# Patient Record
Sex: FEMALE | Race: WHITE
[De-identification: names, ages, dates, MRNs, and addresses within clinical notes are randomized per-mention and may not be internally consistent; named-entity substitution may affect disease eponyms.]

---

## 2020-03-11 ENCOUNTER — HEALTH MAINTENANCE LETTER (OUTPATIENT)
Age: 30
End: 2020-03-11

## 2021-01-03 ENCOUNTER — HEALTH MAINTENANCE LETTER (OUTPATIENT)
Age: 31
End: 2021-01-03

## 2021-01-29 ENCOUNTER — IMMUNIZATION (OUTPATIENT)
Dept: NURSING | Facility: CLINIC | Age: 31
End: 2021-01-29
Payer: COMMERCIAL

## 2021-01-29 PROCEDURE — 91300 PR COVID VAC PFIZER DIL RECON 30 MCG/0.3 ML IM: CPT

## 2021-01-29 PROCEDURE — 0001A PR COVID VAC PFIZER DIL RECON 30 MCG/0.3 ML IM: CPT

## 2021-02-19 ENCOUNTER — IMMUNIZATION (OUTPATIENT)
Dept: NURSING | Facility: CLINIC | Age: 31
End: 2021-02-19
Attending: INTERNAL MEDICINE
Payer: COMMERCIAL

## 2021-02-19 PROCEDURE — 91300 PR COVID VAC PFIZER DIL RECON 30 MCG/0.3 ML IM: CPT

## 2021-02-19 PROCEDURE — 0002A PR COVID VAC PFIZER DIL RECON 30 MCG/0.3 ML IM: CPT

## 2021-04-25 ENCOUNTER — HEALTH MAINTENANCE LETTER (OUTPATIENT)
Age: 31
End: 2021-04-25

## 2021-10-10 ENCOUNTER — HEALTH MAINTENANCE LETTER (OUTPATIENT)
Age: 31
End: 2021-10-10

## 2022-02-03 ENCOUNTER — LAB OUTSIDE AN ENCOUNTER (OUTPATIENT)
Dept: URBAN - METROPOLITAN AREA CLINIC 113 | Facility: CLINIC | Age: 32
End: 2022-02-03

## 2022-02-03 ENCOUNTER — OFFICE VISIT (OUTPATIENT)
Dept: URBAN - METROPOLITAN AREA CLINIC 113 | Facility: CLINIC | Age: 32
End: 2022-02-03
Payer: COMMERCIAL

## 2022-02-03 ENCOUNTER — WEB ENCOUNTER (OUTPATIENT)
Dept: URBAN - METROPOLITAN AREA CLINIC 113 | Facility: CLINIC | Age: 32
End: 2022-02-03

## 2022-02-03 VITALS
TEMPERATURE: 98.6 F | BODY MASS INDEX: 28.17 KG/M2 | SYSTOLIC BLOOD PRESSURE: 146 MMHG | WEIGHT: 165 LBS | HEART RATE: 118 BPM | DIASTOLIC BLOOD PRESSURE: 100 MMHG | HEIGHT: 64 IN

## 2022-02-03 DIAGNOSIS — R93.5 ABNORMAL CT OF THE ABDOMEN: ICD-10-CM

## 2022-02-03 DIAGNOSIS — R19.7 DIARRHEA, UNSPECIFIED TYPE: ICD-10-CM

## 2022-02-03 DIAGNOSIS — K62.5 BLOOD PER RECTUM: ICD-10-CM

## 2022-02-03 PROCEDURE — 99204 OFFICE O/P NEW MOD 45 MIN: CPT | Performed by: NURSE PRACTITIONER

## 2022-02-03 RX ORDER — HYOSCYAMINE SULFATE 0.12 MG/1
1 TABLET UNDER THE TONGUE AND ALLOW TO DISSOLVE  AS NEEDED TABLET SUBLINGUAL
Qty: 90 | Refills: 3 | OUTPATIENT
Start: 2022-02-03 | End: 2022-06-03

## 2022-02-03 RX ORDER — SODIUM, POTASSIUM,MAG SULFATES 17.5-3.13G
354 ML SOLUTION, RECONSTITUTED, ORAL ORAL ONCE
Qty: 354 MILLILITER | Refills: 0 | OUTPATIENT
Start: 2022-02-03 | End: 2022-02-04

## 2022-02-03 NOTE — HPI-TODAY'S VISIT:
31-year-old female presenting for evaluation of colitis. She was recently seen in the ED at [facility] where a CT scan of the abdomen and pelvis with contrast showed multifocal areas of mucosal thickening involving the colon without pericolonic inflammation.  Primary considerations include Crohn's disease or other inflammatory colitides versus infectious colitis. She was discharged with Cipro for possible infectious colitis.  She went to the ED on 1/5/22 for complaints of diarrhea ongoing for 2 months. She noted bright red blood per rectum as well. She was also discharged with prednisone taper. She has watery diarrhea, approximately 10 times per day. She can wake at night time for a bowel movement. She has blood per rectum. No fever or chills. There is mild generalized to left sided abdominal pain. There is nausea but not vomiting. She is not losing weight. There is weakness and fatigue from frequent diarrhea. She can become short of breath easily and can become lightheaded quickly too.

## 2022-02-08 LAB
A/G RATIO: 1.7
ALBUMIN: 4
ALKALINE PHOSPHATASE: 86
ALT (SGPT): 32
AST (SGOT): 16
BASO (ABSOLUTE): 0.1
BASOS: 1
BILIRUBIN, TOTAL: <0.2
BUN/CREATININE RATIO: 10
BUN: 7
C-REACTIVE PROTEIN, QUANT: 3
CALCIUM: 9
CARBON DIOXIDE, TOTAL: 25
CHLORIDE: 102
CREATININE: 0.7
EGFR IF AFRICN AM: 135
EGFR IF NONAFRICN AM: 117
EOS (ABSOLUTE): 0.4
EOS: 4
GLOBULIN, TOTAL: 2.4
GLUCOSE: 89
HEMATOCRIT: 28.1
HEMATOLOGY COMMENTS:: (no result)
HEMOGLOBIN: 8.7
IMMATURE CELLS: (no result)
IMMATURE GRANS (ABS): 0.1
IMMATURE GRANULOCYTES: 1
LYMPHS (ABSOLUTE): 1.9
LYMPHS: 19
MCH: 25.9
MCHC: 31
MCV: 84
MONOCYTES(ABSOLUTE): 1.1
MONOCYTES: 11
NEUTROPHILS (ABSOLUTE): 6.4
NEUTROPHILS: 64
NRBC: (no result)
PLATELETS: 412
POTASSIUM: 4.2
PROTEIN, TOTAL: 6.4
RBC: 3.36
RDW: 13.9
SEDIMENTATION RATE-WESTERGREN: 14
SODIUM: 139
WBC: 10.1

## 2022-02-16 ENCOUNTER — OFFICE VISIT (OUTPATIENT)
Dept: URBAN - METROPOLITAN AREA MEDICAL CENTER 40 | Facility: MEDICAL CENTER | Age: 32
End: 2022-02-16

## 2022-02-16 DIAGNOSIS — K62.5 ANAL BLEEDING: ICD-10-CM

## 2022-02-16 DIAGNOSIS — R93.5 ABNORMAL ABDOMINAL CT SCAN: ICD-10-CM

## 2022-02-16 LAB
C DIFFICILE TOXINS A+B, EIA: NEGATIVE
CAMPYLOBACTER CULTURE: (no result)
E COLI SHIGA TOXIN EIA: NEGATIVE
LACTOFERRIN, FECAL, QUANT.: 84.65
Lab: (no result)
OVA + PARASITE EXAM: (no result)
SALMONELLA/SHIGELLA SCREEN: (no result)

## 2022-03-01 ENCOUNTER — TELEPHONE ENCOUNTER (OUTPATIENT)
Dept: URBAN - METROPOLITAN AREA CLINIC 113 | Facility: CLINIC | Age: 32
End: 2022-03-01

## 2022-03-01 RX ORDER — MESALAMINE 1.2 G/1
2 TABLETS WITH A MEAL TABLET, DELAYED RELEASE ORAL ONCE A DAY
Qty: 60 | OUTPATIENT
Start: 2022-03-01 | End: 2022-03-31

## 2022-03-01 RX ORDER — HYOSCYAMINE SULFATE 0.12 MG/1
1 TABLET UNDER THE TONGUE AND ALLOW TO DISSOLVE  AS NEEDED TABLET SUBLINGUAL
Qty: 90 | Refills: 3 | Status: ACTIVE | COMMUNITY
Start: 2022-02-03 | End: 2022-06-03

## 2022-03-10 ENCOUNTER — TELEPHONE ENCOUNTER (OUTPATIENT)
Dept: URBAN - METROPOLITAN AREA CLINIC 72 | Facility: CLINIC | Age: 32
End: 2022-03-10

## 2022-03-10 RX ORDER — PREDNISONE 10 MG/1
4 TABLETS DAILY FOR 1 WEEK, THEN 3 TABLETS DAILY FOR 1 WEEK, THEN 2 TABLETS DAILY FOR 1 WEEK, THEN 1 TABLET DAILY FOR 1 WEEK, THEN 1/2 TABLET DAILY FOR ONE WEEK THEN STOP TABLET ORAL ONCE A DAY
Qty: 150 | Refills: 0 | OUTPATIENT

## 2022-03-12 LAB
HBSAG SCREEN: NEGATIVE
HEP B CORE AB, TOT: NEGATIVE
HEP B SURFACE AB, QUAL: NON REACTIVE
INTERPRETATION: (no result)
RFX TO HBC IGM: (no result)

## 2022-03-14 LAB
A/G RATIO: 1.8
ALBUMIN: 4.4
ALKALINE PHOSPHATASE: 108
ALT (SGPT): 17
AST (SGOT): 16
BILIRUBIN, TOTAL: <0.2
BUN/CREATININE RATIO: 8
BUN: 7
C-REACTIVE PROTEIN, QUANT: 1
CALCIUM: 8.8
CARBON DIOXIDE, TOTAL: 17
CHLORIDE: 106
CREATININE: 0.84
EGFR: 96
GLOBULIN, TOTAL: 2.4
GLUCOSE: 84
HBSAG SCREEN: NEGATIVE
HEMATOCRIT: 26.6
HEMOGLOBIN: 7.8
HEP B CORE AB, IGM: NEGATIVE
HEP B CORE AB, TOT: NEGATIVE
HEP B SURFACE AB, QUAL: NON REACTIVE
HEP BE AB: NEGATIVE
HEP BE AG: NEGATIVE
MCH: 21.5
MCHC: 29.3
MCV: 73
NRBC: (no result)
PLATELETS: 321
POTASSIUM: 4.3
PROTEIN, TOTAL: 6.8
QUANTIFERON CRITERIA: (no result)
QUANTIFERON INCUBATION: (no result)
QUANTIFERON MITOGEN VALUE: 7.6
QUANTIFERON NIL VALUE: 0.04
QUANTIFERON TB1 AG VALUE: 0.05
QUANTIFERON TB2 AG VALUE: 0.04
QUANTIFERON-TB GOLD PLUS: NEGATIVE
RBC: 3.63
RDW: 16.7
SODIUM: 138
WBC: 9.7

## 2022-03-17 ENCOUNTER — OFFICE VISIT (OUTPATIENT)
Dept: URBAN - METROPOLITAN AREA CLINIC 72 | Facility: CLINIC | Age: 32
End: 2022-03-17
Payer: COMMERCIAL

## 2022-03-17 VITALS
RESPIRATION RATE: 16 BRPM | WEIGHT: 161.8 LBS | SYSTOLIC BLOOD PRESSURE: 152 MMHG | TEMPERATURE: 98.7 F | BODY MASS INDEX: 27.62 KG/M2 | DIASTOLIC BLOOD PRESSURE: 101 MMHG | HEART RATE: 108 BPM | HEIGHT: 64 IN

## 2022-03-17 DIAGNOSIS — K51.00 ULCERATIVE PANCOLITIS WITHOUT COMPLICATION: ICD-10-CM

## 2022-03-17 DIAGNOSIS — D64.9 ANEMIA, UNSPECIFIED TYPE: ICD-10-CM

## 2022-03-17 PROCEDURE — 99214 OFFICE O/P EST MOD 30 MIN: CPT | Performed by: INTERNAL MEDICINE

## 2022-03-17 RX ORDER — HYOSCYAMINE SULFATE 0.12 MG/1
1 TABLET UNDER THE TONGUE AND ALLOW TO DISSOLVE  AS NEEDED TABLET SUBLINGUAL
Qty: 90 | Refills: 3 | Status: ACTIVE | COMMUNITY
Start: 2022-02-03 | End: 2022-06-03

## 2022-03-17 RX ORDER — MESALAMINE 1.2 G/1
2 TABLETS WITH A MEAL TABLET, DELAYED RELEASE ORAL ONCE A DAY
Qty: 60 | Status: DISCONTINUED | COMMUNITY
Start: 2022-03-01 | End: 2022-03-31

## 2022-03-17 RX ORDER — PREDNISONE 10 MG/1
4 TABLETS DAILY FOR 1 WEEK, THEN 3 TABLETS DAILY FOR 1 WEEK, THEN 2 TABLETS DAILY FOR 1 WEEK, THEN 1 TABLET DAILY FOR 1 WEEK, THEN 1/2 TABLET DAILY FOR ONE WEEK THEN STOP TABLET ORAL ONCE A DAY
Qty: 150 | Refills: 0 | Status: ACTIVE | COMMUNITY

## 2022-03-17 NOTE — HPI-TODAY'S VISIT:
Mrs. Vickers returns for follow-up.  Recall she is a pleasant 31-year-old female who was last seen in our office on 2/3/2022 after ER evaluation for abdominal pain.  She had a CT scan of the abdomen pelvis with contrast that showed multifocal areas of mucosal thickening involving the colon without pericolonic inflammation.  This was suggestive of inflammatory bowel disease.  She was ultimately discharged with Cipro .She was placed on steroids as well..  She is suffering from bright red blood per rectum, abdominal pain and diarrhea for 2 months.  She ultimately underwent colonoscopy on 2/16/2022 that was significant for pancolitis described as chronic cobblestoning with erosive erythematous exudate friable mucosa.  Some contact bleeding was noted.  Biopsies were taken and pathology confirmed the presence of chronic colitis moderately active throughout the entire colon.  She was started on mesalamine and lab work for biologic treatment was ordered.  Unfortunately post procedure she experienced ongoing bleeding and felt weak, check hemoglobin at home still be less than 7.  She was advised ER evaluation was ultimately transfused.  Lab work on 3/11/2022 showed a creatinine 0.84, bilirubin less than 0.2, alkaline phosphatase 108, AST 16, ALT 17, WBC 9.7, hemoglobin 7.8, MCV 73, platelets 321, hepatitis B profile negative, QuantiFERON gold negative.  CRP was noted to be 1  She was again placed on steroids with taper and plan to start entyvio  She started steroids yesterday, she has had little bleeding,she is awating entyvio approval not hep b immune tb negative

## 2022-03-29 ENCOUNTER — TELEPHONE ENCOUNTER (OUTPATIENT)
Dept: URBAN - METROPOLITAN AREA CLINIC 72 | Facility: CLINIC | Age: 32
End: 2022-03-29

## 2022-04-12 ENCOUNTER — OFFICE VISIT (OUTPATIENT)
Dept: URBAN - METROPOLITAN AREA CLINIC 72 | Facility: CLINIC | Age: 32
End: 2022-04-12

## 2022-04-12 ENCOUNTER — TELEPHONE ENCOUNTER (OUTPATIENT)
Dept: URBAN - METROPOLITAN AREA CLINIC 72 | Facility: CLINIC | Age: 32
End: 2022-04-12

## 2022-04-12 RX ORDER — PREDNISONE 5 MG/1
ONE TABLET DAILY FOR 4 WEEKS TABLET ORAL ONCE A DAY
Qty: 40 | Refills: 0

## 2022-05-22 ENCOUNTER — HEALTH MAINTENANCE LETTER (OUTPATIENT)
Age: 32
End: 2022-05-22

## 2022-09-18 ENCOUNTER — HEALTH MAINTENANCE LETTER (OUTPATIENT)
Age: 32
End: 2022-09-18

## 2022-11-30 ENCOUNTER — OFFICE VISIT (OUTPATIENT)
Dept: URBAN - METROPOLITAN AREA CLINIC 72 | Facility: CLINIC | Age: 32
End: 2022-11-30
Payer: COMMERCIAL

## 2022-11-30 ENCOUNTER — WEB ENCOUNTER (OUTPATIENT)
Dept: URBAN - METROPOLITAN AREA CLINIC 72 | Facility: CLINIC | Age: 32
End: 2022-11-30

## 2022-11-30 VITALS
HEART RATE: 113 BPM | WEIGHT: 155 LBS | DIASTOLIC BLOOD PRESSURE: 99 MMHG | HEIGHT: 64 IN | BODY MASS INDEX: 26.46 KG/M2 | SYSTOLIC BLOOD PRESSURE: 153 MMHG | TEMPERATURE: 98.6 F

## 2022-11-30 DIAGNOSIS — K51.00 ULCERATIVE PANCOLITIS WITHOUT COMPLICATION: ICD-10-CM

## 2022-11-30 DIAGNOSIS — K21.9 GASTROESOPHAGEAL REFLUX DISEASE WITHOUT ESOPHAGITIS: ICD-10-CM

## 2022-11-30 DIAGNOSIS — R19.7 DIARRHEA, UNSPECIFIED TYPE: ICD-10-CM

## 2022-11-30 DIAGNOSIS — R14.0 ABDOMINAL BLOATING: ICD-10-CM

## 2022-11-30 PROCEDURE — 99214 OFFICE O/P EST MOD 30 MIN: CPT | Performed by: NURSE PRACTITIONER

## 2022-11-30 PROCEDURE — 99214 OFFICE O/P EST MOD 30 MIN: CPT | Performed by: INTERNAL MEDICINE

## 2022-11-30 RX ORDER — FAMOTIDINE 40 MG/1
1 TABLET TWICE DAILY TABLET, FILM COATED ORAL TWICE A DAY
Qty: 60 TABLET | Refills: 2 | OUTPATIENT
Start: 2022-11-30

## 2022-11-30 RX ORDER — PREDNISONE 5 MG/1
ONE TABLET DAILY FOR 4 WEEKS TABLET ORAL ONCE A DAY
Qty: 40 | Refills: 0 | Status: ON HOLD | COMMUNITY

## 2022-11-30 NOTE — HPI-TODAY'S VISIT:
32-year-old female here for rectal bleeding.  History of ulcerative pancolitis last seen in the office 3/17/2022.  Diagnosed with pan ulcerative colitis 2/16/2022 and originally started on mesalamine.  Postprocedure she experienced ongoing bleeding felt weak hemoglobin was less than 7 and was sent to the emergency department.  Plan was for steroid taper and Entyvio.  Recommended follow-up in 2 months.  ER evaluation for abdominal pain early this year.  She had a CT scan of the abdomen pelvis with contrast that showed multifocal areas of mucosal thickening involving the colon without pericolonic inflammation.  This was suggestive of inflammatory bowel disease.  She was ultimately discharged with Cipro. She was placed on steroids as well.  She ultimately underwent colonoscopy on 2/16/2022 that was significant for pancolitis described as chronic cobblestoning with erosive erythematous exudate friable mucosa.  Some contact bleeding was noted.  Biopsies were taken and pathology confirmed the presence of chronic colitis moderately active throughout the entire colon.  She was started on mesalamine and lab work for biologic treatment was ordered.  Unfortunately post procedure she experienced ongoing bleeding and felt weak, check hemoglobin at home still be less than 7.  She was advised ER evaluation was ultimately transfused.  On interview today she reports that she goes between diarrhea and constipation. Last Entyvio infusion at [place] was 10/31. Next infusion is 12/27. Was having some upper abdominal/burning pain. Was nauseated.  No vomiting. Symptoms lasted for 2 weeks after her infusion.  Was having some bright red blood in her stool.  She reports this week she hasn't had any blood in her stool for a week.  Most days her stools are 5-6 on the Searcy Scale and will have 5-6 BM's a day.  No mucous. She is having nocturnal symptoms. Today only one formed BM.  No abdominal pain today.   She is having bloating and gas pains though.  Denies NSAID's.  Hasn't gone Hep B series or flu shot.  She just moved to [place] but is down here visiting family.  She denies pregnancy.  No vomiting, dysphagia or melena. Weight is down 6 pounds from her last appointment.

## 2023-02-14 ENCOUNTER — TELEPHONE ENCOUNTER (OUTPATIENT)
Dept: URBAN - METROPOLITAN AREA CLINIC 72 | Facility: CLINIC | Age: 33
End: 2023-02-14

## 2023-02-17 ENCOUNTER — DASHBOARD ENCOUNTERS (OUTPATIENT)
Age: 33
End: 2023-02-17

## 2023-02-17 ENCOUNTER — OFFICE VISIT (OUTPATIENT)
Dept: URBAN - METROPOLITAN AREA CLINIC 72 | Facility: CLINIC | Age: 33
End: 2023-02-17
Payer: COMMERCIAL

## 2023-02-17 VITALS
TEMPERATURE: 97.2 F | WEIGHT: 150 LBS | BODY MASS INDEX: 25.61 KG/M2 | HEART RATE: 129 BPM | DIASTOLIC BLOOD PRESSURE: 101 MMHG | SYSTOLIC BLOOD PRESSURE: 153 MMHG | HEIGHT: 64 IN

## 2023-02-17 DIAGNOSIS — K62.5 BRIGHT RED BLOOD PER RECTUM: ICD-10-CM

## 2023-02-17 DIAGNOSIS — K21.9 GASTROESOPHAGEAL REFLUX DISEASE WITHOUT ESOPHAGITIS: ICD-10-CM

## 2023-02-17 DIAGNOSIS — K51.00 ULCERATIVE PANCOLITIS WITHOUT COMPLICATION: ICD-10-CM

## 2023-02-17 PROBLEM — 266435005: Status: ACTIVE | Noted: 2022-11-30

## 2023-02-17 PROBLEM — 444548001: Status: ACTIVE | Noted: 2022-03-01

## 2023-02-17 PROCEDURE — 99214 OFFICE O/P EST MOD 30 MIN: CPT | Performed by: INTERNAL MEDICINE

## 2023-02-17 RX ORDER — PREDNISONE 10 MG/1
4 TABLETS DAILY FOR 7 DAYS; 3.5 TABLETS DAILY FOR 7 DAYS; 3 TABLETS DAILY FOR 7 DAYS; 2.5 TABLETS DAILY FOR 7 DAYS; 2 TABLETS DAILY FOR 7 DAYS; 1.5 TABLETS DAILY FOR 7 DAYS; 1 TABLET DAILY FOR 7 DAYS TABLET ORAL ONCE A DAY
Qty: 150 | Refills: 0 | OUTPATIENT
Start: 2023-02-17 | End: 2023-04-18

## 2023-02-17 RX ORDER — PREDNISONE 5 MG/1
ONE TABLET DAILY FOR 4 WEEKS TABLET ORAL ONCE A DAY
Qty: 40 | Refills: 0 | Status: ON HOLD | COMMUNITY

## 2023-02-17 RX ORDER — PANTOPRAZOLE SODIUM 20 MG/1
1 TABLET 30 MINUTES BEFORE BREAKFAST ON AN EMPTY STOMACH TABLET, DELAYED RELEASE ORAL ONCE A DAY
Qty: 30 | Refills: 1 | OUTPATIENT
Start: 2023-02-17

## 2023-02-17 RX ORDER — FAMOTIDINE 40 MG/1
1 TABLET TWICE DAILY TABLET, FILM COATED ORAL TWICE A DAY
Qty: 60 TABLET | Refills: 2 | Status: ACTIVE | COMMUNITY
Start: 2022-11-30

## 2023-02-17 NOTE — HPI-TODAY'S VISIT:
32-year-old female here for rectal bleeding.    Last seen by myself 11/30/2022 for ulcerative pancolitis.  On Entyvio.  Labs and stool studies ordered as well is Entyvio antibodies.  She was in the process of moving to [place] and was going to research who she wanted referred to.  Had labs and stool tests in January.  Was advised to follow up with local GI.    Diagnosed with pan ulcerative colitis 2/16/2022 and originally started on mesalamine.    ER evaluation for abdominal pain early 2022.  She had a CT scan of the abdomen pelvis with contrast that showed multifocal areas of mucosal thickening involving the colon without pericolonic inflammation.  This was suggestive of inflammatory bowel disease.  She was ultimately discharged with Cipro. She was placed on steroids as well.  She ultimately underwent colonoscopy on 2/16/2022 that was significant for pancolitis described as chronic cobblestoning with erosive erythematous exudate friable mucosa.  Some contact bleeding was noted.  Biopsies were taken and pathology confirmed the presence of chronic colitis moderately active throughout the entire colon.  She was started on mesalamine and lab work for biologic treatment was ordered.  Unfortunately post procedure she experienced ongoing bleeding and felt weak, check hemoglobin at home still be less than 7.  She was advised ER evaluation was ultimately transfused.  Last note:  On interview today she reports that she goes between diarrhea and constipation. Next infusion is 12/27. Was having some upper abdominal/burning pain. Was nauseated.  No vomiting. Symptoms lasted for 2 weeks after her infusion.  Was having some bright red blood in her stool.  She reports this week she hasn't had any blood in her stool for a week.  Most days her stools are 5-6 on the Bee Scale and will have 5-6 BM's a day.  No mucous. She is having nocturnal symptoms. Today only one formed BM.  No abdominal pain today.     Labs 1/6/2023.  CBC: WBC 11.2.  Neutrophils 7.1.  CMP normal.  CRP high end of normal at 10. Labs 1/26/2023.  C. difficile negative.  Fecal lactoferrin greater than 240.  Fecal calprotectin 889.  On interview today she just drove down here from [place]. She doesn't have insurance currently and has not establish a gastroenterologist in [place].  She was offered insurance with her employer but wasn't sure if it covered Entyvio. She doesn't have Cobra.  Last Entyvio infusion at [place] was 10/31. Has 10+ BM's a day, blood and mucous with abdominal cramping.  Type 6-7.  Urgency, no incontince.  Feels abdomen is on fire and nauseated, no vomiting.  On famotidine for GERD.  Weight is down 5 pounds.  No vomiting. She is having bloating and gas pains though.  Denies NSAID's.  Hasn't gone Hep B series or flu shot.  She just moved to [place] but is down here visiting family.  She denies pregnancy.  No vomiting, dysphagia or melena. Weight is down 6 pounds from her last appointment.

## 2023-02-17 NOTE — PHYSICAL EXAM GASTROINTESTINAL
soft, generalized tenderness,  no guarding or rigidity, nondistended , normal bowel sounds , no rebound tenderness

## 2023-03-02 ENCOUNTER — WEB ENCOUNTER (OUTPATIENT)
Dept: URBAN - METROPOLITAN AREA CLINIC 72 | Facility: CLINIC | Age: 33
End: 2023-03-02

## 2023-03-13 ENCOUNTER — LAB OUTSIDE AN ENCOUNTER (OUTPATIENT)
Dept: URBAN - METROPOLITAN AREA CLINIC 72 | Facility: CLINIC | Age: 33
End: 2023-03-13

## 2023-03-14 ENCOUNTER — WEB ENCOUNTER (OUTPATIENT)
Dept: URBAN - METROPOLITAN AREA CLINIC 72 | Facility: CLINIC | Age: 33
End: 2023-03-14

## 2023-03-15 LAB
HBSAG SCREEN: NEGATIVE
HEP B CORE AB, TOT: NEGATIVE
HEP B SURFACE AB, QUAL: NON REACTIVE
QUANTIFERON INCUBATION: (no result)
QUANTIFERON MITOGEN VALUE: 2.01
QUANTIFERON NIL VALUE: 0
QUANTIFERON TB1 AG VALUE: 0.01
QUANTIFERON TB2 AG VALUE: 0.01
QUANTIFERON-TB GOLD PLUS: NEGATIVE

## 2023-04-10 ENCOUNTER — WEB ENCOUNTER (OUTPATIENT)
Dept: URBAN - METROPOLITAN AREA CLINIC 72 | Facility: CLINIC | Age: 33
End: 2023-04-10

## 2023-04-10 ENCOUNTER — ERX REFILL RESPONSE (OUTPATIENT)
Dept: URBAN - METROPOLITAN AREA CLINIC 72 | Facility: CLINIC | Age: 33
End: 2023-04-10

## 2023-04-10 RX ORDER — PREDNISONE 10 MG/1
4 TABLETS DAILY FOR 7 DAYS; 3.5 TABLETS DAILY FOR 7 DAYS; 3 TABLETS DAILY FOR 7 DAYS; 2.5 TABLETS DAILY FOR 7 DAYS; 2 TABLETS DAILY FOR 7 DAYS; 1.5 TABLETS DAILY FOR 7 DAYS; 1 TABLET DAILY FOR 7 DAYS TABLET ORAL ONCE A DAY
Qty: 150 | Refills: 0 | OUTPATIENT

## 2023-04-10 RX ORDER — PREDNISONE 10 MG/1
1 TABLET TABLET ORAL ONCE A DAY
Qty: 30 | Refills: 0 | OUTPATIENT

## 2023-04-23 ENCOUNTER — ERX REFILL RESPONSE (OUTPATIENT)
Dept: URBAN - METROPOLITAN AREA CLINIC 72 | Facility: CLINIC | Age: 33
End: 2023-04-23

## 2023-04-23 RX ORDER — PANTOPRAZOLE SODIUM 20 MG/1
1 TABLET 30 MINUTES BEFORE BREAKFAST ON AN EMPTY STOMACH TABLET, DELAYED RELEASE ORAL ONCE A DAY
Qty: 30 | Refills: 1 | OUTPATIENT

## 2023-04-23 RX ORDER — PANTOPRAZOLE SODIUM 20 MG/1
1 TABLET 30 MINUTES BEFORE BREAKFAST ON AN EMPTY STOMACH ORALLY ONCE A DAY 30 DAYS TABLET, DELAYED RELEASE ORAL
Qty: 30 TABLET | Refills: 1 | OUTPATIENT

## 2023-05-16 ENCOUNTER — ERX REFILL RESPONSE (OUTPATIENT)
Dept: URBAN - METROPOLITAN AREA CLINIC 72 | Facility: CLINIC | Age: 33
End: 2023-05-16

## 2023-05-16 RX ORDER — PREDNISONE 10 MG/1
1 TABLET TABLET ORAL ONCE A DAY
Qty: 30 | Refills: 0 | OUTPATIENT

## 2023-05-16 RX ORDER — PREDNISONE 10 MG/1
TAKE 1 TABLET ORALLY ONCE A DAY FOR 2 WEEKS AND THEN 1/2 TABLET DAILY TABLET ORAL
Qty: 30 TABLET | Refills: 0 | OUTPATIENT

## 2023-05-20 ENCOUNTER — WEB ENCOUNTER (OUTPATIENT)
Dept: URBAN - METROPOLITAN AREA CLINIC 72 | Facility: CLINIC | Age: 33
End: 2023-05-20

## 2023-06-04 ENCOUNTER — HEALTH MAINTENANCE LETTER (OUTPATIENT)
Age: 33
End: 2023-06-04

## 2023-06-15 ENCOUNTER — WEB ENCOUNTER (OUTPATIENT)
Dept: URBAN - METROPOLITAN AREA CLINIC 72 | Facility: CLINIC | Age: 33
End: 2023-06-15

## 2024-01-16 ENCOUNTER — TELEPHONE ENCOUNTER (OUTPATIENT)
Dept: URBAN - METROPOLITAN AREA CLINIC 113 | Facility: CLINIC | Age: 34
End: 2024-01-16